# Patient Record
Sex: FEMALE | Race: OTHER | HISPANIC OR LATINO | URBAN - METROPOLITAN AREA
[De-identification: names, ages, dates, MRNs, and addresses within clinical notes are randomized per-mention and may not be internally consistent; named-entity substitution may affect disease eponyms.]

---

## 2019-07-19 ENCOUNTER — OFFICE (OUTPATIENT)
Dept: URBAN - METROPOLITAN AREA CLINIC 78 | Facility: CLINIC | Age: 74
End: 2019-07-19

## 2019-07-19 VITALS
HEART RATE: 70 BPM | DIASTOLIC BLOOD PRESSURE: 84 MMHG | TEMPERATURE: 98.7 F | HEIGHT: 60 IN | WEIGHT: 166 LBS | SYSTOLIC BLOOD PRESSURE: 129 MMHG

## 2019-07-19 DIAGNOSIS — K30 FUNCTIONAL DYSPEPSIA: ICD-10-CM

## 2019-07-19 DIAGNOSIS — R14.0 ABDOMINAL DISTENSION (GASEOUS): ICD-10-CM

## 2019-07-19 DIAGNOSIS — R10.84 GENERALIZED ABDOMINAL PAIN: ICD-10-CM

## 2019-07-19 DIAGNOSIS — R11.0 NAUSEA: ICD-10-CM

## 2019-07-19 DIAGNOSIS — R11.10 VOMITING, UNSPECIFIED: ICD-10-CM

## 2019-07-19 DIAGNOSIS — Z12.11 ENCOUNTER FOR SCREENING FOR MALIGNANT NEOPLASM OF COLON: ICD-10-CM

## 2019-07-19 PROCEDURE — 99204 OFFICE O/P NEW MOD 45 MIN: CPT

## 2019-07-19 NOTE — SERVICEHPINOTES
HOLDEN MORALES   is a   74   year old    female who is being seen in consultation at the request of   DEWEY MCLEAN   for OV prior to colonoscopy. She has daily BMs, BSS type 4 predominately: No blood in stool. No prior colonoscopy done for CRC screening. No fm h/o CRC or IBD. She is also here concerning vague, diffused abdominal pain, described as "cramping," occurs 3-4x/week, lasting several minutes, asso with "gas pains" and bloating: No nocturnal pain. She was seen for these concerns by PCP who ordered 06/2019 CT scan abdomen/pelvis that found "no acute process stable small fat containing umbilical hernia colonic diverticulosis" so referral given by PCP to have "EGD/colonoscopy" to further evaluate. No trial of PPI or H2RB. No known h/o h pylori. Per daughter in the room, she is concerned about possible lactose sensitivity/intolerance: No trial of lactaid enzymes. She mentions h/o typhoid fever around age 40's. No known cardiac or pulmonary disease. Rare NSAID use. Denies chest pain, dyspnea, vomiting, dysphagia/odynophagia, melena, blood in stool, rectal bleeding, weight loss. BR

## 2019-07-22 LAB — HELICOBACTER PYLORI, UREA BREATH TEST: NOT DETECTED

## 2019-07-31 ENCOUNTER — ON CAMPUS - OUTPATIENT (OUTPATIENT)
Dept: URBAN - METROPOLITAN AREA HOSPITAL 63 | Facility: HOSPITAL | Age: 74
End: 2019-07-31

## 2019-07-31 DIAGNOSIS — K30 FUNCTIONAL DYSPEPSIA: ICD-10-CM

## 2019-07-31 DIAGNOSIS — R11.0 NAUSEA: ICD-10-CM

## 2019-07-31 DIAGNOSIS — K20.8 OTHER ESOPHAGITIS: ICD-10-CM

## 2019-07-31 PROCEDURE — 43239 EGD BIOPSY SINGLE/MULTIPLE: CPT

## 2019-08-08 ENCOUNTER — AMBULATORY SURGICAL CENTER (OUTPATIENT)
Dept: URBAN - METROPOLITAN AREA SURGERY 21 | Facility: SURGERY | Age: 74
End: 2019-08-08
Payer: MEDICARE

## 2019-08-08 DIAGNOSIS — Z12.11 ENCOUNTER FOR SCREENING FOR MALIGNANT NEOPLASM OF COLON: ICD-10-CM

## 2019-08-08 PROCEDURE — G0121 COLON CA SCRN NOT HI RSK IND: HCPCS
